# Patient Record
Sex: MALE | Race: WHITE | NOT HISPANIC OR LATINO
[De-identification: names, ages, dates, MRNs, and addresses within clinical notes are randomized per-mention and may not be internally consistent; named-entity substitution may affect disease eponyms.]

---

## 2022-05-17 ENCOUNTER — APPOINTMENT (OUTPATIENT)
Dept: ORTHOPEDIC SURGERY | Facility: CLINIC | Age: 47
End: 2022-05-17
Payer: OTHER MISCELLANEOUS

## 2022-05-17 PROBLEM — Z00.00 ENCOUNTER FOR PREVENTIVE HEALTH EXAMINATION: Status: ACTIVE | Noted: 2022-05-17

## 2022-05-17 PROCEDURE — 99213 OFFICE O/P EST LOW 20 MIN: CPT

## 2022-05-17 PROCEDURE — 99072 ADDL SUPL MATRL&STAF TM PHE: CPT

## 2022-05-17 RX ORDER — LIDOCAINE 5% 700 MG/1
5 PATCH TOPICAL
Qty: 30 | Refills: 0 | Status: ACTIVE | COMMUNITY
Start: 2022-05-17 | End: 1900-01-01

## 2022-05-17 NOTE — WORK
[Was the competent medical cause of the injury] : was the competent medical cause of the injury [Consistent with my objective findings] : consistent with my objective findings [Partial] : partial [Does not reveal pre-existing condition(s) that may affect treatment/prognosis] : does not reveal pre-existing condition(s) that may affect treatment/prognosis [Cannot return to work because ________] : cannot return to work because [unfilled] [No Rx restrictions] : No Rx restrictions. [I provided the services listed above] :  I provided the services listed above.

## 2022-05-17 NOTE — HISTORY OF PRESENT ILLNESS
[Work related] : work related [7] : 7 [5] : 5 [Burning] : burning [Dull/Aching] : dull/aching [Localized] : localized [Constant] : constant [Leisure] : leisure [Social interactions] : social interactions [Rest] : rest [Meds] : meds [Sitting] : sitting [Standing] : standing [Walking] : walking [Stairs] : stairs [de-identified] :   5/19/20\par [employer]\par \par 5/4/21: L ankle injury after caught between boardwalk and railing while at work. he had a laceration and visited ED and had sutured. wound dehisced. notes he is seeing PMD and treating wound with antibiotic cream and it is slowly healing. notes no FC. no drainage from wound. He saw Dr Mcdermott where bracing/PT done. Has continued swelling and sensitivity to the ankle. He had an injection to the left ankle as well on 4/28/21 without relief. Feels he is walking differently. Hes not working.\par \par 8/3/21: f/u L ankle to review the MRI. He feels better with the PT.\par 9/28/21 f/u L ankle better w/PT Not working\par 11/16/21 f/u L ankle was better w/ PT Lateral burning pain new Brace Not working\par 1/4/22: f/u L ankle and L knee, worsening pain since PT was not authorized. Had a setback. last session in September. having to wear left ankle brace again. not working\par 3/10/24 f/u L ankle knee PT not authorized Not working\par 5/17/22: f/u L ankle; reports feeling worse w/ burning pain. PT keeps getting denied. Not working [] : Post Surgical Visit: no [FreeTextEntry1] : L ankle [FreeTextEntry3] :  5/19/20

## 2023-02-23 ENCOUNTER — APPOINTMENT (OUTPATIENT)
Dept: ORTHOPEDIC SURGERY | Facility: CLINIC | Age: 48
End: 2023-02-23
Payer: OTHER MISCELLANEOUS

## 2023-02-23 PROCEDURE — 99072 ADDL SUPL MATRL&STAF TM PHE: CPT

## 2023-02-23 PROCEDURE — 99213 OFFICE O/P EST LOW 20 MIN: CPT

## 2023-02-23 NOTE — PHYSICAL EXAM
[Left] : left foot and ankle [NL (40)] : plantar flexion 40 degrees [NL 30)] : inversion 30 degrees [NL (20)] : eversion 20 degrees [5___] : plantar flexion 5[unfilled]/5 [2+] : dorsalis pedis pulse: 2+ [Decreased] : saphenous nerve sensation decreased [] : no calf tenderness [FreeTextEntry3] : left healed medial wound 8cm [de-identified] : BURNING LATERAL HINDFOOT [TWNoteComboBox7] : dorsiflexion 10 degrees

## 2023-02-23 NOTE — HISTORY OF PRESENT ILLNESS
[Work related] : work related [7] : 7 [5] : 5 [Burning] : burning [Dull/Aching] : dull/aching [Localized] : localized [Constant] : constant [Leisure] : leisure [Social interactions] : social interactions [Rest] : rest [Meds] : meds [Sitting] : sitting [Standing] : standing [Walking] : walking [Stairs] : stairs [de-identified] :   5/19/20\[employer]\par \par 5/4/21: L ankle injury after caught between boardwalk and railing while at work. he had a laceration and visited ED and had sutured. wound dehisced. notes he is seeing PMD and treating wound with antibiotic cream and it is slowly healing. notes no FC. no drainage from wound. He saw Dr Mcdermott where bracing/PT done. Has continued swelling and sensitivity to the ankle. He had an injection to the left ankle as well on 4/28/21 without relief. Feels he is walking differently. Hes not working.\par \par 8/3/21: f/u L ankle to review the MRI. He feels better with the PT.\par 9/28/21 f/u L ankle better w/PT Not working\par 11/16/21 f/u L ankle was better w/ PT Lateral burning pain new Brace Not working\par 1/4/22: f/u L ankle and L knee, worsening pain since PT was not authorized. Had a setback. last session in September. having to wear left ankle brace again. not working\par 3/10/24 f/u L ankle knee PT not authorized Not working\par 5/17/22: f/u L ankle; reports feeling worse w/ burning pain. PT keeps getting denied. Not working\par 2/23/23: F/U L ankle- Needs  Increasing pain last week  PT auth requested  Not working [] : Post Surgical Visit: no [FreeTextEntry1] : L ankle [FreeTextEntry3] :  5/19/20

## 2023-04-11 ENCOUNTER — APPOINTMENT (OUTPATIENT)
Dept: ORTHOPEDIC SURGERY | Facility: CLINIC | Age: 48
End: 2023-04-11

## 2023-07-20 ENCOUNTER — APPOINTMENT (OUTPATIENT)
Dept: ORTHOPEDIC SURGERY | Facility: CLINIC | Age: 48
End: 2023-07-20
Payer: OTHER MISCELLANEOUS

## 2023-07-20 PROCEDURE — 99213 OFFICE O/P EST LOW 20 MIN: CPT

## 2023-07-20 PROCEDURE — 95864 NEEDLE EMG 4 EXTREMITIES: CPT

## 2023-07-20 NOTE — HISTORY OF PRESENT ILLNESS
[Work related] : work related [7] : 7 [5] : 5 [Burning] : burning [Dull/Aching] : dull/aching [Localized] : localized [Constant] : constant [Leisure] : leisure [Social interactions] : social interactions [Rest] : rest [Meds] : meds [Sitting] : sitting [Standing] : standing [Walking] : walking [Stairs] : stairs [de-identified] :   5/19/20\[employer]\par \par 5/4/21: L ankle injury after caught between boardwalk and railing while at work. he had a laceration and visited ED and had sutured. wound dehisced. notes he is seeing PMD and treating wound with antibiotic cream and it is slowly healing. notes no FC. no drainage from wound. He saw Dr Mcdermott where bracing/PT done. Has continued swelling and sensitivity to the ankle. He had an injection to the left ankle as well on 4/28/21 without relief. Feels he is walking differently. Hes not working.\par \par 8/3/21: f/u L ankle to review the MRI. He feels better with the PT.\par 9/28/21 f/u L ankle better w/PT Not working\par 11/16/21 f/u L ankle was better w/ PT Lateral burning pain new Brace Not working\par 1/4/22: f/u L ankle and L knee, worsening pain since PT was not authorized. Had a setback. last session in September. having to wear left ankle brace again. not working\par 3/10/24 f/u L ankle knee PT not authorized Not working\par 5/17/22: f/u L ankle; reports feeling worse w/ burning pain. PT keeps getting denied. Not working\par 2/23/23: F/U L ankle- Needs  Increasing pain last week  PT auth requested  Not working\par 07/20/23: f/u L ankle. Continued burning pain to the left ankle; did not get the EMG. PT was denied. He last had PT in 9/2022 and found it very beneficial. Not working  [] : Post Surgical Visit: no [FreeTextEntry1] : L ankle [FreeTextEntry3] :  5/19/20

## 2023-07-20 NOTE — PHYSICAL EXAM
[Left] : left foot and ankle [NL (40)] : plantar flexion 40 degrees [NL 30)] : inversion 30 degrees [NL (20)] : eversion 20 degrees [5___] : plantar flexion 5[unfilled]/5 [2+] : dorsalis pedis pulse: 2+ [Decreased] : saphenous nerve sensation decreased [] : no calf tenderness [FreeTextEntry3] : left lower leg healed medial wound 8cm [de-identified] : BURNING LATERAL HINDFOOT [TWNoteComboBox7] : dorsiflexion 10 degrees

## 2023-07-20 NOTE — DISCUSSION/SUMMARY
[de-identified] : Will continue to request auth for PT. PT is necessary to aide in ROM and decrease pain.\par Request EMG/NCV for burning pain\par He is not working\par f/u 6 weeks

## 2023-10-09 ENCOUNTER — APPOINTMENT (OUTPATIENT)
Dept: NEUROLOGY | Facility: CLINIC | Age: 48
End: 2023-10-09
Payer: OTHER MISCELLANEOUS

## 2023-10-09 PROCEDURE — 95912 NRV CNDJ TEST 11-12 STUDIES: CPT

## 2023-10-09 PROCEDURE — 95886 MUSC TEST DONE W/N TEST COMP: CPT

## 2023-11-16 ENCOUNTER — APPOINTMENT (OUTPATIENT)
Dept: ORTHOPEDIC SURGERY | Facility: CLINIC | Age: 48
End: 2023-11-16
Payer: OTHER MISCELLANEOUS

## 2023-11-16 DIAGNOSIS — S81.812D LACERATION W/OUT FOREIGN BODY, LEFT LOWER LEG, SUBSEQUENT ENCOUNTER: ICD-10-CM

## 2023-11-16 DIAGNOSIS — S87.82XD: ICD-10-CM

## 2023-11-16 PROCEDURE — 72170 X-RAY EXAM OF PELVIS: CPT

## 2023-11-16 PROCEDURE — 72100 X-RAY EXAM L-S SPINE 2/3 VWS: CPT

## 2023-11-16 PROCEDURE — 99214 OFFICE O/P EST MOD 30 MIN: CPT

## 2024-03-28 ENCOUNTER — APPOINTMENT (OUTPATIENT)
Dept: ORTHOPEDIC SURGERY | Facility: CLINIC | Age: 49
End: 2024-03-28
Payer: OTHER MISCELLANEOUS

## 2024-03-28 DIAGNOSIS — S86.012D STRAIN OF LEFT ACHILLES TENDON, SUBSEQUENT ENCOUNTER: ICD-10-CM

## 2024-03-28 DIAGNOSIS — M24.572 CONTRACTURE, LEFT ANKLE: ICD-10-CM

## 2024-03-28 PROCEDURE — 99214 OFFICE O/P EST MOD 30 MIN: CPT

## 2024-03-28 NOTE — RETURN TO WORK/SCHOOL
[Full Time Work] : full time work [Work] : work [FreeTextEntry1] : Cleared for strenuous lifeguarding activities.

## 2024-03-28 NOTE — PHYSICAL EXAM
[Left] : left foot and ankle [NL (40)] : plantar flexion 40 degrees [NL 30)] : inversion 30 degrees [NL (20)] : eversion 20 degrees [5___] : plantar flexion 5[unfilled]/5 [2+] : dorsalis pedis pulse: 2+ [Decreased] : saphenous nerve sensation decreased [] : no calf tenderness [FreeTextEntry3] : left lower leg healed medial wound 8cm [de-identified] : BURNING LATERAL HINDFOOT [TWNoteComboBox7] : dorsiflexion 10 degrees

## 2024-03-28 NOTE — DATA REVIEWED
[MRI] : MRI [Lumbar Spine] : lumbar spine [Report was reviewed and noted in the chart] : The report was reviewed and noted in the chart [I independently reviewed and interpreted images and report] : I independently reviewed and interpreted images and report [FreeTextEntry1] : L4-5 disc herniation, L5-S1 herniation/foraminal stenosis,

## 2024-03-28 NOTE — HISTORY OF PRESENT ILLNESS
[Work related] : work related [5] : 5 [Burning] : burning [Dull/Aching] : dull/aching [Localized] : localized [Constant] : constant [Leisure] : leisure [Social interactions] : social interactions [Rest] : rest [Meds] : meds [Sitting] : sitting [Standing] : standing [Walking] : walking [Stairs] : stairs [10] : 10 [Sharp] : sharp [Shooting] : shooting [Throbbing] : throbbing [de-identified] :   5/19/20 [employer]  5/4/21: L ankle injury after caught between boardwalk and railing while at work. he had a laceration and visited ED and had sutured. wound dehisced. notes he is seeing PMD and treating wound with antibiotic cream and it is slowly healing. notes no FC. no drainage from wound. He saw Dr Mcdermott where bracing/PT done. Has continued swelling and sensitivity to the ankle. He had an injection to the left ankle as well on 4/28/21 without relief. Feels he is walking differently. Hes not working.  8/3/21: f/u L ankle to review the MRI. He feels better with the PT. 9/28/21 f/u L ankle better w/PT Not working 11/16/21 f/u L ankle was better w/ PT Lateral burning pain new Brace Not working 1/4/22: f/u L ankle and L knee, worsening pain since PT was not authorized. Had a setback. last session in September. having to wear left ankle brace again. not working 3/10/24 f/u L ankle knee PT not authorized Not working 5/17/22: f/u L ankle; reports feeling worse w/ burning pain. PT keeps getting denied. Not working 2/23/23: F/U L ankle- Needs  Increasing pain last week  PT auth requested  Not working 07/20/23: f/u L ankle. Continued burning pain to the left ankle; did not get the EMG. PT was denied. He last had PT in 9/2022 and found it very beneficial. Not working  11/16/23: Here for EMG Results; L5-S1L > R  radiculopathy  pain to the right side of the back and feels  radiating pain in the left leg Not working 03/28/2024 - Follow up  L ankle. Here for MRI Results.  R sided lbp Not wotrking [] : Post Surgical Visit: no [FreeTextEntry1] : L ankle [FreeTextEntry3] :  5/19/20

## 2024-05-08 ENCOUNTER — APPOINTMENT (OUTPATIENT)
Dept: ORTHOPEDIC SURGERY | Facility: CLINIC | Age: 49
End: 2024-05-08
Payer: OTHER MISCELLANEOUS

## 2024-05-08 DIAGNOSIS — M54.16 RADICULOPATHY, LUMBAR REGION: ICD-10-CM

## 2024-05-08 PROCEDURE — 99214 OFFICE O/P EST MOD 30 MIN: CPT
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
## 2024-05-08 NOTE — ASSESSMENT
[FreeTextEntry1] : I had a lengthy discussion with the patient in regards to treatment plan and diagnosis. There are no red flag findings on imaging nor are there any red flag findings on clinical exam.  Therefore we will proceed with a course of conservative treatment.  This would include physical therapy/home exercise program, Tylenol, NSAIDs as medically indicated.  The patient will follow up with me in approximately 4-5 weeks.  I encouraged the patient to follow-up sooner if there are any new or worsening symptoms.  We will also provide a prescription, referral for [facility]

## 2024-05-08 NOTE — PHYSICAL EXAM
[de-identified] : Lumbar Physical Exam  Antalgic gait, patient walks with a forward pitched posture Reflexes Patellar - normal Gastroc - normal Clonus - No  Hip Exam - Normal  Straight leg raise - none  Pulses - 2+ dp/pt  Range of motion - normal  Sensation  Sensation intact to light touch in L1, L2, L3, L4, L5 and S1 dermatomes bilaterally  Motor 	IP	Quad	HS	TA	Gastroc	EHL Right	5/5	5/5	5/5	5/5	5/5	5/5 Left	5/5	5/5	5/5	5/5	5/5	5/5 [de-identified] : Lumbar radiographs Decreased lumbar lordosis Facet arthropathy noted Disc degeneration noted  Lumbar MRI reviewed Stand-up MRI No areas of critical/severe central or foraminal stenosis

## 2024-05-09 ENCOUNTER — APPOINTMENT (OUTPATIENT)
Dept: ORTHOPEDIC SURGERY | Facility: CLINIC | Age: 49
End: 2024-05-09

## 2024-06-11 ENCOUNTER — APPOINTMENT (OUTPATIENT)
Dept: ORTHOPEDIC SURGERY | Facility: CLINIC | Age: 49
End: 2024-06-11

## 2024-06-27 ENCOUNTER — APPOINTMENT (OUTPATIENT)
Dept: ORTHOPEDIC SURGERY | Facility: CLINIC | Age: 49
End: 2024-06-27

## 2024-07-18 ENCOUNTER — APPOINTMENT (OUTPATIENT)
Dept: ORTHOPEDIC SURGERY | Facility: CLINIC | Age: 49
End: 2024-07-18
Payer: OTHER MISCELLANEOUS

## 2024-07-18 DIAGNOSIS — M24.572 CONTRACTURE, LEFT ANKLE: ICD-10-CM

## 2024-07-18 DIAGNOSIS — S81.812D LACERATION W/OUT FOREIGN BODY, LEFT LOWER LEG, SUBSEQUENT ENCOUNTER: ICD-10-CM

## 2024-07-18 DIAGNOSIS — M54.16 RADICULOPATHY, LUMBAR REGION: ICD-10-CM

## 2024-07-18 DIAGNOSIS — S86.012D STRAIN OF LEFT ACHILLES TENDON, SUBSEQUENT ENCOUNTER: ICD-10-CM

## 2024-07-18 DIAGNOSIS — S87.82XD: ICD-10-CM

## 2024-07-18 PROCEDURE — 99213 OFFICE O/P EST LOW 20 MIN: CPT

## 2024-09-12 ENCOUNTER — APPOINTMENT (OUTPATIENT)
Dept: ORTHOPEDIC SURGERY | Facility: CLINIC | Age: 49
End: 2024-09-12
Payer: OTHER MISCELLANEOUS

## 2024-09-12 VITALS — WEIGHT: 220 LBS | HEIGHT: 75 IN | BODY MASS INDEX: 27.35 KG/M2

## 2024-09-12 DIAGNOSIS — M54.16 RADICULOPATHY, LUMBAR REGION: ICD-10-CM

## 2024-09-12 DIAGNOSIS — S87.82XD: ICD-10-CM

## 2024-09-12 DIAGNOSIS — S86.012D STRAIN OF LEFT ACHILLES TENDON, SUBSEQUENT ENCOUNTER: ICD-10-CM

## 2024-09-12 DIAGNOSIS — S81.812D LACERATION W/OUT FOREIGN BODY, LEFT LOWER LEG, SUBSEQUENT ENCOUNTER: ICD-10-CM

## 2024-09-12 DIAGNOSIS — M24.572 CONTRACTURE, LEFT ANKLE: ICD-10-CM

## 2024-09-12 PROCEDURE — 99214 OFFICE O/P EST MOD 30 MIN: CPT

## 2024-09-12 NOTE — HISTORY OF PRESENT ILLNESS
[Work related] : work related [10] : 10 [5] : 5 [Burning] : burning [Dull/Aching] : dull/aching [Localized] : localized [Sharp] : sharp [Shooting] : shooting [Throbbing] : throbbing [Constant] : constant [Leisure] : leisure [Social interactions] : social interactions [Rest] : rest [Meds] : meds [Sitting] : sitting [Standing] : standing [Walking] : walking [Stairs] : stairs [de-identified] :   5/19/20 [employer]  5/4/21: L ankle injury after caught between boardwalk and railing while at work. he had a laceration and visited ED and had sutured. wound dehisced. notes he is seeing PMD and treating wound with antibiotic cream and it is slowly healing. notes no FC. no drainage from wound. He saw Dr Mcdermott where bracing/PT done. Has continued swelling and sensitivity to the ankle. He had an injection to the left ankle as well on 4/28/21 without relief. Feels he is walking differently. Hes not working.  8/3/21: f/u L ankle to review the MRI. He feels better with the PT. 9/28/21 f/u L ankle better w/PT Not working 11/16/21 f/u L ankle was better w/ PT Lateral burning pain new Brace Not working 1/4/22: f/u L ankle and L knee, worsening pain since PT was not authorized. Had a setback. last session in September. having to wear left ankle brace again. not working 3/10/24 f/u L ankle knee PT not authorized Not working 5/17/22: f/u L ankle; reports feeling worse w/ burning pain. PT keeps getting denied. Not working 2/23/23: F/U L ankle- Needs  Increasing pain last week  PT auth requested  Not working 07/20/23: f/u L ankle. Continued burning pain to the left ankle; did not get the EMG. PT was denied. He last had PT in 9/2022 and found it very beneficial. Not working  11/16/23: Here for EMG Results; L5-S1L > R  radiculopathy  pain to the right side of the back and feels  radiating pain in the left leg Not working 03/28/2024 - Follow up  L ankle. Here for MRI Results.  R sided lbp Not wotrking 07/18/2024 Patient is following up on left ankle. pain has stayed the same. WB in sneakers.  PT pending Not working 9/12/2024:  follow up visit, LT ankle. Continuing HEP. Reports worsening pins/needles since last visit.  Having R achilles issues Was better w/PT  Not working [] : Post Surgical Visit: no [FreeTextEntry1] : L ankle [FreeTextEntry3] :  5/19/20

## 2024-09-12 NOTE — PHYSICAL EXAM
[Left] : left foot and ankle [NL (40)] : plantar flexion 40 degrees [NL 30)] : inversion 30 degrees [NL (20)] : eversion 20 degrees [5___] : eversion 5[unfilled]/5 [2+] : dorsalis pedis pulse: 2+ [Decreased] : saphenous nerve sensation decreased [] : no calf tenderness [FreeTextEntry3] : left lower leg healed medial wound 8cm [de-identified] : burning pain [TWNoteComboBox7] : dorsiflexion 10 degrees

## 2024-10-31 ENCOUNTER — APPOINTMENT (OUTPATIENT)
Dept: ORTHOPEDIC SURGERY | Facility: CLINIC | Age: 49
End: 2024-10-31
Payer: OTHER MISCELLANEOUS

## 2024-10-31 DIAGNOSIS — M24.572 CONTRACTURE, LEFT ANKLE: ICD-10-CM

## 2024-10-31 DIAGNOSIS — M54.16 RADICULOPATHY, LUMBAR REGION: ICD-10-CM

## 2024-10-31 DIAGNOSIS — S87.82XD: ICD-10-CM

## 2024-10-31 DIAGNOSIS — S81.812D LACERATION W/OUT FOREIGN BODY, LEFT LOWER LEG, SUBSEQUENT ENCOUNTER: ICD-10-CM

## 2024-10-31 DIAGNOSIS — S86.012D STRAIN OF LEFT ACHILLES TENDON, SUBSEQUENT ENCOUNTER: ICD-10-CM

## 2024-10-31 PROCEDURE — 99214 OFFICE O/P EST MOD 30 MIN: CPT

## 2024-12-12 ENCOUNTER — APPOINTMENT (OUTPATIENT)
Dept: ORTHOPEDIC SURGERY | Facility: CLINIC | Age: 49
End: 2024-12-12

## 2025-04-03 ENCOUNTER — APPOINTMENT (OUTPATIENT)
Dept: ORTHOPEDIC SURGERY | Facility: CLINIC | Age: 50
End: 2025-04-03
Payer: OTHER MISCELLANEOUS

## 2025-04-03 DIAGNOSIS — S87.82XD: ICD-10-CM

## 2025-04-03 DIAGNOSIS — S81.812D LACERATION W/OUT FOREIGN BODY, LEFT LOWER LEG, SUBSEQUENT ENCOUNTER: ICD-10-CM

## 2025-04-03 DIAGNOSIS — M24.572 CONTRACTURE, LEFT ANKLE: ICD-10-CM

## 2025-04-03 DIAGNOSIS — S86.012D STRAIN OF LEFT ACHILLES TENDON, SUBSEQUENT ENCOUNTER: ICD-10-CM

## 2025-04-03 PROCEDURE — 99455 WORK RELATED DISABILITY EXAM: CPT
